# Patient Record
Sex: MALE | Race: WHITE | URBAN - METROPOLITAN AREA
[De-identification: names, ages, dates, MRNs, and addresses within clinical notes are randomized per-mention and may not be internally consistent; named-entity substitution may affect disease eponyms.]

---

## 2019-04-18 ENCOUNTER — APPOINTMENT (OUTPATIENT)
Dept: GENERAL RADIOLOGY | Facility: CLINIC | Age: 44
End: 2019-04-18
Attending: EMERGENCY MEDICINE
Payer: OTHER MISCELLANEOUS

## 2019-04-18 ENCOUNTER — HOSPITAL ENCOUNTER (EMERGENCY)
Facility: CLINIC | Age: 44
Discharge: HOME | End: 2019-04-18
Attending: EMERGENCY MEDICINE | Admitting: EMERGENCY MEDICINE
Payer: OTHER MISCELLANEOUS

## 2019-04-18 VITALS
HEIGHT: 70 IN | RESPIRATION RATE: 16 BRPM | OXYGEN SATURATION: 100 % | SYSTOLIC BLOOD PRESSURE: 115 MMHG | HEART RATE: 66 BPM | BODY MASS INDEX: 22.9 KG/M2 | TEMPERATURE: 97.9 F | WEIGHT: 160 LBS | DIASTOLIC BLOOD PRESSURE: 73 MMHG

## 2019-04-18 DIAGNOSIS — S92.911A CLOSED NONDISPLACED FRACTURE OF PHALANX OF TOE OF RIGHT FOOT, UNSPECIFIED TOE, INITIAL ENCOUNTER: ICD-10-CM

## 2019-04-18 PROCEDURE — 73660 X-RAY EXAM OF TOE(S): CPT | Mod: RT

## 2019-04-18 PROCEDURE — 28510 TREATMENT OF TOE FRACTURE: CPT | Mod: T7,T8

## 2019-04-18 PROCEDURE — 99283 EMERGENCY DEPT VISIT LOW MDM: CPT | Mod: 25

## 2019-04-18 RX ORDER — HYDROCODONE BITARTRATE AND ACETAMINOPHEN 5; 325 MG/1; MG/1
1 TABLET ORAL EVERY 6 HOURS PRN
Qty: 8 TABLET | Refills: 0 | Status: SHIPPED | OUTPATIENT
Start: 2019-04-18 | End: 2019-04-24

## 2019-04-18 RX ORDER — CEPHALEXIN 500 MG/1
500 CAPSULE ORAL 3 TIMES DAILY
Qty: 15 CAPSULE | Refills: 0 | Status: SHIPPED | OUTPATIENT
Start: 2019-04-18 | End: 2019-04-24

## 2019-04-18 ASSESSMENT — ENCOUNTER SYMPTOMS: WOUND: 1

## 2019-04-18 ASSESSMENT — MIFFLIN-ST. JEOR: SCORE: 1622.01

## 2019-04-18 NOTE — ED PROVIDER NOTES
"  History     Chief Complaint:    Foot Pain    HPI   Richie Belcher is a 44 year old male who presents with toe pain. The patient reports that today while he was at work he was rolling a steel plate for an umbrella when it dropped onto his right foot, primarily his toes. He has since had pain and swelling on the 2-4th toes with bruising and bleeding. He denies foot or ankle pain. The patient believes that his tetanus is up to date    Allergies:  No known drug allergies.       Medications:    No current medications.     Past Medical History:    Medical history reviewed. No pertinent medical history.      Past Surgical History:    Procure bone marrow    Family History:    Family history reviewed. No pertinent family history.     Social History:  This is a work related injury, the patient works at [employer].    Review of Systems   Musculoskeletal:        Toe pain  No foot or ankle pain   Skin: Positive for wound.   All other systems reviewed and are negative.    Physical Exam     Patient Vitals for the past 24 hrs:   BP Temp Temp src Pulse Resp SpO2 Height Weight   04/18/19 1358 -- -- -- -- 16 -- -- --   04/18/19 1207 115/73 97.9  F (36.6  C) Temporal 66 14 100 % 1.778 m (5' 10\") 72.6 kg (160 lb)      Physical Exam    Nursing note and vitals reviewed.    Constitutional:  Appears well-developed and well-nourished, comfortable.    Cardiovascular:  Normal rate, regular rhythm.     Peripheral pulse 2+ in DP artery.  Cap refill less than 2 seconds.  Musculoskeletal:  Range of motion his ankle is normal. No cyanosis in his toes.      3rd and 4th right toes ecchymotic with small amount of blood forming from a small cut around the base of the nail on the 3rd toe. No deformity of the digits.     Some bruising at the end of the 2nd toe at the base of the nail.     2-4th toes with palpable tenderness.     Foot and ankle normal. Dorsal pedis pulses and capillary refill normal.   Neurological:   Alert and oriented. " Exhibits good muscle tone.      Sensation in the affected extremity normal.  Skin:    Skin is warm and dry. No rash noted.  Bruising as noted above in his toes.    Psychiatric:   Behavior is normal. Appropriate mood and affect.     Judgment and thought content normal.     Emergency Department Course     Imaging:  Radiology findings were communicated with the patient who voiced understanding of the findings.    XR Toe Right G/E 2 Views   Preliminary Result   IMPRESSION: Small lateral third and fourth digit distal phalangeal   base fractures noted.        Reading per radiology     Emergency Department Course:     Nursing notes and vitals reviewed.    1228 I performed an exam of the patient as documented above.     1236 The patient was sent for a XR while in the emergency department, results above.      1317 Patient rechecked and updated.       I personally reviewed the imaging results with the patient and answered all related questions prior to discharge.    Impression & Plan      Medical Decision Making:  Richie Belcher is a 44 year old male who presents for evaluation of toe pain after a metal plate landed on his left foot. CMS is intact distally in the extremity.  Xray's of the foot reveal a 3rd and 4th phalanx fracture that does not need reduction at this time.  The toe was  taped and he was placed in a postop shoe. The patient understand that this may change with time and orthopedic follow-up is indicated.  There is no indication for ortho or podiatry consultation from the ED. Rather, close follow-up is indicated in the next days. He also has a small open wound overlying the fracture on the toe which I will start him on a course of Keflex for 5 days.  Fracture precautions for home.  The patients head to toe trauma exam is otherwise normal at this time and no further trauma workup is needed as I believe there is no signs of serious head, neck, chest, spinal, extremity or abdominal injuries.  I am going to keep  him off work today and tomorrow as we can elevate and ice this but should be able to return to work on Monday.  He will need to follow-up with his doctor next week if he is not able to return to full duty.  I do not expect any permanent partial disability and I expect maximum medical improvement in 3-4 weeks.    Diagnosis:    ICD-10-CM    1. Closed nondisplaced fracture of phalanx of toe of right foot, unspecified toe, initial encounter S92.911A     3rd and 4th distal phalanx     Disposition:   The patient is discharged to home. Keep the toe clean, change the Band-Aid daily as needed.  Use the postop shoe for comfort and support.  Ice and elevate when not walking on your foot.  Off work today and tomorrow.  Recheck if any signs of infection develop.  Tylenol or Motrin as needed.  If your pain is more severe you may use the Norco as needed.  Keflex 3 times a day for 5 days.  Recheck next week as needed in the clinic, especially if you cannot do your job.    Discharge Medications:       Review of your medicines      START taking      Dose / Directions   cephALEXin 500 MG capsule  Commonly known as:  KEFLEX      Dose:  500 mg  Take 1 capsule (500 mg) by mouth 3 times daily for 5 days  Quantity:  15 capsule  Refills:  0     HYDROcodone-acetaminophen 5-325 MG tablet  Commonly known as:  NORCO      Dose:  1 tablet  Take 1 tablet by mouth every 6 hours as needed for severe pain  Quantity:  8 tablet  Refills:  0           Where to get your medicines      Some of these will need a paper prescription and others can be bought over the counter. Ask your nurse if you have questions.    Bring a paper prescription for each of these medications    cephALEXin 500 MG capsule    HYDROcodone-acetaminophen 5-325 MG tablet       Scribe Disclosure:  I, Orla Severson, am serving as a scribe at 12:29 PM on 4/18/2019 to document services personally performed by Razia Porter MD based on my observations and the provider's statements to  me.       EMERGENCY DEPARTMENT       Razia Porter MD  04/18/19 1232

## 2019-04-18 NOTE — DISCHARGE INSTRUCTIONS
Keep the toe clean, change the Band-Aid daily as needed.  Use the postop shoe for comfort and support.  Ice and elevate when not walking on your foot.  Off work today and tomorrow.  Recheck if any signs of infection develop.  Tylenol or Motrin as needed.  If your pain is more severe you may use the Norco as needed.  Keflex 3 times a day for 5 days.  Recheck next week as needed in the clinic, especially if you cannot do your job.

## 2019-04-18 NOTE — LETTER
April 18, 2019      To Whom It May Concern:      Richie Belcher was seen in our Emergency Department today, 04/18/19.  I expect his condition to improve over the next 3 days.  He may return to work/school when improved. Patient has foot fractures.     Sincerely,        Wandy Youngblood RN

## 2019-04-18 NOTE — ED AVS SNAPSHOT
Emergency Department  [address] [place] [address]  Phone:  [phone]  Fax:  [phone]                                    Richie Belcher   MRN: [id number]    Department:   Emergency Department   Date of Visit:  4/18/2019           After Visit Summary Signature Page    I have received my discharge instructions, and my questions have been answered. I have discussed any challenges I see with this plan with the nurse or doctor.    ..........................................................................................................................................  Patient/Patient Representative Signature      ..........................................................................................................................................  Patient Representative Print Name and Relationship to Patient    ..................................................               ................................................  Date                                   Time    ..........................................................................................................................................  Reviewed by Signature/Title    ...................................................              ..............................................  Date                                               Time          22EPIC Rev 08/18

## 2019-04-24 ENCOUNTER — OFFICE VISIT (OUTPATIENT)
Dept: URGENT CARE | Facility: URGENT CARE | Age: 44
End: 2019-04-24
Payer: OTHER MISCELLANEOUS

## 2019-04-24 VITALS
DIASTOLIC BLOOD PRESSURE: 63 MMHG | OXYGEN SATURATION: 100 % | SYSTOLIC BLOOD PRESSURE: 104 MMHG | HEART RATE: 79 BPM | TEMPERATURE: 98.8 F

## 2019-04-24 DIAGNOSIS — S92.534D: Primary | ICD-10-CM

## 2019-04-24 PROCEDURE — 99203 OFFICE O/P NEW LOW 30 MIN: CPT | Performed by: PHYSICIAN ASSISTANT

## 2019-04-24 NOTE — PROGRESS NOTES
SUBJECTIVE:  Chief Complaint   Patient presents with     Urgent Care     Trauma     right ankle injury. Dropped metal plate at work on foot. Happened this past thursday. was seen and was told to f/u to make sure that ankle healing properly.      Richie Belcher is a 44 year old male who presents for follow-up on toe fracture. Patient reports that one week ago, he was at work and dropped a metal plate on his foot. He was seen in the ED and diagnosed with 3rd / 4th distal phalanx fractures. He has been wearing his post op shoe since the accident. He has intermittent throbbing, but otherwise his symptoms have improved.  He denies having numbness or tingling into his toes. He has not taken any medication for his symptoms.       No past medical history on file.  No current outpatient medications on file.     Social History     Tobacco Use     Smoking status: Never Smoker     Smokeless tobacco: Never Used   Substance Use Topics     Alcohol use: Yes     Comment: occ       ROS:  Review of systems negative except as stated above.    EXAM:   /63   Pulse 79   Temp 98.8  F (37.1  C) (Oral)   SpO2 100%   M/S Exam:  Right 3rd and 4th toes have minor bruising without swelling. No erythema. FROM   GENERAL APPEARANCE: healthy, alert and no distress  EXTREMITIES: peripheral pulses normal  SKIN: no suspicious lesions or rashes  NEURO: Normal strength and tone, sensory exam grossly normal, mentation intact and speech normal      ASSESSMENT / PLAN:  1. Open nondisplaced fracture of distal phalanx of lesser toe of right foot with routine healing, subsequent encounter  Patient with healing fracture of distal phalanx, no sign of infection.   I encouraged him to continue wearing postop shoe. A note was given for work for this wekk he missed.   Given how small the fracture is, I think it is reasonable to begin wearing his tennis shoes at work as long as it doesn't impede his toes.   He can take IBU for pain.   Follow-up with PCP  if pain increases. Fracture takes 4-6 weeks to fully heal.     Kaylene Benson PA-C

## 2019-04-24 NOTE — LETTER
[facility]  [address]  [place] [address]  Phone: [phone]  Fax: [phone]    April 24, 2019        Richie Belcher  [address] [address]          To whom it may concern:    RE: Richie Belcher    Patient was seen and treated today at our clinic. Please excuse patient from 4/22/2019 - 4/26/2019. He may return to work on 4/29/2019.     Please contact me for questions or concerns.      Sincerely,        Kaylene Benson PA-C